# Patient Record
Sex: FEMALE | Race: WHITE
[De-identification: names, ages, dates, MRNs, and addresses within clinical notes are randomized per-mention and may not be internally consistent; named-entity substitution may affect disease eponyms.]

---

## 2020-02-23 ENCOUNTER — HOSPITAL ENCOUNTER (EMERGENCY)
Dept: HOSPITAL 96 - M.ERS | Age: 40
Discharge: HOME | End: 2020-02-23
Payer: COMMERCIAL

## 2020-02-23 VITALS — WEIGHT: 170 LBS | HEIGHT: 62 IN | BODY MASS INDEX: 31.28 KG/M2

## 2020-02-23 VITALS — SYSTOLIC BLOOD PRESSURE: 136 MMHG | DIASTOLIC BLOOD PRESSURE: 79 MMHG

## 2020-02-23 DIAGNOSIS — B02.9: Primary | ICD-10-CM

## 2020-05-27 ENCOUNTER — HOSPITAL ENCOUNTER (EMERGENCY)
Dept: HOSPITAL 96 - M.ERS | Age: 40
Discharge: HOME | End: 2020-05-27
Payer: COMMERCIAL

## 2020-05-27 VITALS — SYSTOLIC BLOOD PRESSURE: 124 MMHG | DIASTOLIC BLOOD PRESSURE: 81 MMHG

## 2020-05-27 VITALS — WEIGHT: 175 LBS | HEIGHT: 62 IN | BODY MASS INDEX: 32.2 KG/M2

## 2020-05-27 DIAGNOSIS — Z90.49: ICD-10-CM

## 2020-05-27 DIAGNOSIS — R07.89: Primary | ICD-10-CM

## 2020-05-27 LAB
ABSOLUTE BASOPHILS: 0 THOU/UL (ref 0–0.2)
ABSOLUTE EOSINOPHILS: 0.1 THOU/UL (ref 0–0.7)
ABSOLUTE MONOCYTES: 0.4 THOU/UL (ref 0–1.2)
ALBUMIN SERPL-MCNC: 3.8 G/DL (ref 3.4–5)
ALP SERPL-CCNC: 77 U/L (ref 46–116)
ALT SERPL-CCNC: 35 U/L (ref 30–65)
ANION GAP SERPL CALC-SCNC: 7 MMOL/L (ref 7–16)
AST SERPL-CCNC: 25 U/L (ref 15–37)
BASOPHILS NFR BLD AUTO: 0.3 %
BILIRUB SERPL-MCNC: 0.4 MG/DL
BUN SERPL-MCNC: 18 MG/DL (ref 7–18)
CALCIUM SERPL-MCNC: 8.5 MG/DL (ref 8.5–10.1)
CHLORIDE SERPL-SCNC: 103 MMOL/L (ref 98–107)
CO2 SERPL-SCNC: 29 MMOL/L (ref 21–32)
CREAT SERPL-MCNC: 0.7 MG/DL (ref 0.6–1.3)
EOSINOPHIL NFR BLD: 1.1 %
GLUCOSE SERPL-MCNC: 122 MG/DL (ref 70–99)
GRANULOCYTES NFR BLD MANUAL: 58.7 %
HCT VFR BLD CALC: 42.2 % (ref 37–47)
HGB BLD-MCNC: 14.3 GM/DL (ref 12–15)
INR PPP: 1
LIPASE: 78 U/L (ref 73–393)
LYMPHOCYTES # BLD: 1.9 THOU/UL (ref 0.8–5.3)
LYMPHOCYTES NFR BLD AUTO: 32.8 %
MCH RBC QN AUTO: 30.6 PG (ref 26–34)
MCHC RBC AUTO-ENTMCNC: 33.9 G/DL (ref 28–37)
MCV RBC: 90 FL (ref 80–100)
MONOCYTES NFR BLD: 7.1 %
MPV: 7.4 FL. (ref 7.2–11.1)
NEUTROPHILS # BLD: 3.3 THOU/UL (ref 1.6–8.1)
NUCLEATED RBCS: 0 /100WBC
PLATELET COUNT*: 310 THOU/UL (ref 150–400)
POTASSIUM SERPL-SCNC: 3.8 MMOL/L (ref 3.5–5.1)
PROT SERPL-MCNC: 7.5 G/DL (ref 6.4–8.2)
PROTHROMBIN TIME: 10.3 SECONDS (ref 9.2–11.5)
RBC # BLD AUTO: 4.68 MIL/UL (ref 4.2–5)
RDW-CV: 13.6 % (ref 10.5–14.5)
SODIUM SERPL-SCNC: 139 MMOL/L (ref 136–145)
WBC # BLD AUTO: 5.7 THOU/UL (ref 4–11)

## 2020-05-27 NOTE — EKG
[place] [address]
Phone:  [phone]                     ELECTROCARDIOGRAM REPORT      
_______________________________________________________________________________
 
Name:         OSWALDO HOLGUIN                Room:                     [facility]#:    [id number]     Account #:     [id number]  
Admission:    20    Attend Phys:                     
Discharge:    20    Date of Birth: [birth year]  
Date of Service: 20  Report #:      [id number]
        [id number]
_______________________________________________________________________________
THIS REPORT FOR:  //name//                      
 
                         [facility] ED
                                       
Test Date:    2020               Test Time:    07:14:26
Pat Name:     OSWALDO HOLGUIN             Department:   
Patient ID:   [id number]            Room:          
Gender:                               Technician:   
:          1980               Requested By: Estrellita Cornelius
Order Number: [id number] MD:   Reinaldo Valdez
                                 Measurements
Intervals                              Axis          
Rate:         86                       P:            14
NC:           158                      QRS:          -18
QRSD:         95                       T:            0
QT:           370                                    
QTc:          443                                    
                           Interpretive Statements
Sinus rhythm
Left ventricular hypertrophy
Compared to ECG 2015 11:11:42
Left ventricular hypertrophy now present
Sinus tachycardia no longer present
ST (T wave) deviation no longer present
 
Electronically Signed On 2020 14:10:30 CDT by Reinaldo Valdez
[email or web address]
 
 
 
 
 
 
 
 
 
 
 
 
 
 
 
 
 
  <ELECTRONICALLY SIGNED>
                                           By: Reinaldo Valdez MD, [facility]     
  20     1410
D: 20 0714   _____________________________________
T: 20 0714   Reinaldo Valdez MD, [facility]       /EPI